# Patient Record
Sex: MALE | Race: WHITE | NOT HISPANIC OR LATINO
[De-identification: names, ages, dates, MRNs, and addresses within clinical notes are randomized per-mention and may not be internally consistent; named-entity substitution may affect disease eponyms.]

---

## 2018-08-17 ENCOUNTER — TRANSCRIPTION ENCOUNTER (OUTPATIENT)
Age: 18
End: 2018-08-17

## 2019-02-09 ENCOUNTER — TRANSCRIPTION ENCOUNTER (OUTPATIENT)
Age: 19
End: 2019-02-09

## 2019-03-12 ENCOUNTER — APPOINTMENT (OUTPATIENT)
Dept: PSYCHIATRY | Facility: CLINIC | Age: 19
End: 2019-03-12
Payer: COMMERCIAL

## 2019-03-12 PROCEDURE — 99205 OFFICE O/P NEW HI 60 MIN: CPT

## 2019-04-05 ENCOUNTER — APPOINTMENT (OUTPATIENT)
Dept: PSYCHIATRY | Facility: CLINIC | Age: 19
End: 2019-04-05
Payer: COMMERCIAL

## 2019-04-05 PROCEDURE — 99214 OFFICE O/P EST MOD 30 MIN: CPT

## 2019-05-10 ENCOUNTER — APPOINTMENT (OUTPATIENT)
Dept: PSYCHIATRY | Facility: CLINIC | Age: 19
End: 2019-05-10
Payer: COMMERCIAL

## 2019-05-10 PROCEDURE — 99214 OFFICE O/P EST MOD 30 MIN: CPT

## 2019-06-07 ENCOUNTER — APPOINTMENT (OUTPATIENT)
Dept: PSYCHIATRY | Facility: CLINIC | Age: 19
End: 2019-06-07
Payer: COMMERCIAL

## 2019-06-07 PROCEDURE — 99214 OFFICE O/P EST MOD 30 MIN: CPT

## 2019-07-08 ENCOUNTER — RX RENEWAL (OUTPATIENT)
Age: 19
End: 2019-07-08

## 2019-07-22 ENCOUNTER — APPOINTMENT (OUTPATIENT)
Dept: PSYCHIATRY | Facility: CLINIC | Age: 19
End: 2019-07-22
Payer: COMMERCIAL

## 2019-07-22 PROCEDURE — 99214 OFFICE O/P EST MOD 30 MIN: CPT

## 2019-09-09 ENCOUNTER — RX RENEWAL (OUTPATIENT)
Age: 19
End: 2019-09-09

## 2019-10-14 ENCOUNTER — APPOINTMENT (OUTPATIENT)
Dept: PSYCHIATRY | Facility: CLINIC | Age: 19
End: 2019-10-14
Payer: COMMERCIAL

## 2019-10-14 PROCEDURE — 99214 OFFICE O/P EST MOD 30 MIN: CPT

## 2019-12-19 ENCOUNTER — APPOINTMENT (OUTPATIENT)
Dept: PSYCHIATRY | Facility: CLINIC | Age: 19
End: 2019-12-19
Payer: COMMERCIAL

## 2019-12-19 PROCEDURE — 99214 OFFICE O/P EST MOD 30 MIN: CPT

## 2020-02-14 ENCOUNTER — APPOINTMENT (OUTPATIENT)
Dept: PSYCHIATRY | Facility: CLINIC | Age: 20
End: 2020-02-14
Payer: COMMERCIAL

## 2020-02-14 PROCEDURE — 99214 OFFICE O/P EST MOD 30 MIN: CPT

## 2020-04-17 RX ORDER — ESCITALOPRAM OXALATE 10 MG/1
10 TABLET ORAL
Qty: 30 | Refills: 0 | Status: DISCONTINUED | COMMUNITY
Start: 2019-03-12 | End: 2020-04-17

## 2020-04-17 RX ORDER — ESCITALOPRAM OXALATE 5 MG/1
5 TABLET ORAL
Qty: 30 | Refills: 1 | Status: DISCONTINUED | COMMUNITY
Start: 2020-02-14 | End: 2020-04-17

## 2020-05-12 ENCOUNTER — APPOINTMENT (OUTPATIENT)
Dept: PSYCHIATRY | Facility: CLINIC | Age: 20
End: 2020-05-12
Payer: COMMERCIAL

## 2020-05-12 PROCEDURE — 99214 OFFICE O/P EST MOD 30 MIN: CPT | Mod: 95

## 2020-08-31 ENCOUNTER — APPOINTMENT (OUTPATIENT)
Dept: PSYCHIATRY | Facility: CLINIC | Age: 20
End: 2020-08-31
Payer: COMMERCIAL

## 2020-08-31 PROCEDURE — 99214 OFFICE O/P EST MOD 30 MIN: CPT | Mod: 95

## 2020-11-11 ENCOUNTER — TRANSCRIPTION ENCOUNTER (OUTPATIENT)
Age: 20
End: 2020-11-11

## 2020-12-09 ENCOUNTER — APPOINTMENT (OUTPATIENT)
Dept: PSYCHIATRY | Facility: CLINIC | Age: 20
End: 2020-12-09
Payer: COMMERCIAL

## 2020-12-09 PROCEDURE — 99214 OFFICE O/P EST MOD 30 MIN: CPT

## 2020-12-09 PROCEDURE — 99072 ADDL SUPL MATRL&STAF TM PHE: CPT

## 2020-12-09 RX ORDER — TRAZODONE HYDROCHLORIDE 50 MG/1
50 TABLET ORAL
Qty: 30 | Refills: 1 | Status: DISCONTINUED | COMMUNITY
Start: 2019-06-07 | End: 2020-12-09

## 2021-02-24 ENCOUNTER — APPOINTMENT (OUTPATIENT)
Dept: PSYCHIATRY | Facility: CLINIC | Age: 21
End: 2021-02-24
Payer: COMMERCIAL

## 2021-02-24 PROCEDURE — 99072 ADDL SUPL MATRL&STAF TM PHE: CPT

## 2021-02-24 PROCEDURE — 99214 OFFICE O/P EST MOD 30 MIN: CPT

## 2021-05-24 ENCOUNTER — APPOINTMENT (OUTPATIENT)
Dept: PSYCHIATRY | Facility: CLINIC | Age: 21
End: 2021-05-24

## 2021-05-25 ENCOUNTER — APPOINTMENT (OUTPATIENT)
Dept: PSYCHIATRY | Facility: CLINIC | Age: 21
End: 2021-05-25
Payer: COMMERCIAL

## 2021-05-25 PROCEDURE — 99214 OFFICE O/P EST MOD 30 MIN: CPT

## 2021-05-25 PROCEDURE — 99072 ADDL SUPL MATRL&STAF TM PHE: CPT

## 2021-08-24 ENCOUNTER — APPOINTMENT (OUTPATIENT)
Dept: PSYCHIATRY | Facility: CLINIC | Age: 21
End: 2021-08-24
Payer: COMMERCIAL

## 2021-08-24 PROCEDURE — 99214 OFFICE O/P EST MOD 30 MIN: CPT

## 2021-11-24 ENCOUNTER — APPOINTMENT (OUTPATIENT)
Dept: PSYCHIATRY | Facility: CLINIC | Age: 21
End: 2021-11-24
Payer: COMMERCIAL

## 2021-11-24 PROCEDURE — 99214 OFFICE O/P EST MOD 30 MIN: CPT

## 2022-02-08 ENCOUNTER — APPOINTMENT (OUTPATIENT)
Dept: PSYCHIATRY | Facility: CLINIC | Age: 22
End: 2022-02-08
Payer: COMMERCIAL

## 2022-02-08 PROCEDURE — 99214 OFFICE O/P EST MOD 30 MIN: CPT | Mod: 95

## 2022-06-14 ENCOUNTER — APPOINTMENT (OUTPATIENT)
Dept: PSYCHIATRY | Facility: CLINIC | Age: 22
End: 2022-06-14
Payer: COMMERCIAL

## 2022-06-14 PROCEDURE — 99214 OFFICE O/P EST MOD 30 MIN: CPT | Mod: 95

## 2022-11-14 ENCOUNTER — APPOINTMENT (OUTPATIENT)
Dept: PSYCHIATRY | Facility: CLINIC | Age: 22
End: 2022-11-14

## 2022-11-14 PROCEDURE — 99214 OFFICE O/P EST MOD 30 MIN: CPT | Mod: 95

## 2022-11-14 RX ORDER — ALUMINUM CHLORIDE 20 %
20 SOLUTION, NON-ORAL TOPICAL
Qty: 60 | Refills: 0 | Status: DISCONTINUED | COMMUNITY
Start: 2022-06-27

## 2022-11-14 RX ORDER — MELOXICAM 15 MG/1
15 TABLET ORAL
Qty: 30 | Refills: 0 | Status: DISCONTINUED | COMMUNITY
Start: 2022-05-17

## 2022-11-14 NOTE — PLAN
[Medication education provided] : Medication education provided. [FreeTextEntry5] : Psychoeducation and supportive therapy\par Continue mindfulness meditation and breathing exercises regularly\par Medication: continue Lexapro 10+5 mg/day \par Educated patient of adverse effects of excess and frequent ETOH use and importance of remaining abstinent from drugs \par Emergency procedures were discussed: pt. educated to call 911 or go to nearest ER for worsening of symptoms/suicidal/homicidal ideation. \par RTC in 3 months or earlier as needed. \par Patient expressed understanding and agreement with above plan. \par

## 2022-11-14 NOTE — DISCUSSION/SUMMARY
[FreeTextEntry1] : The patient is a 17 yo man with familial predisposition and anxious predisposition reports sx consistent with generalized anxiety disorder and depressive disorder NOS and his sx are increasing since 2018 summer, causing significant distress and  affecting his daily functioning. \par \par Patient is continues to remain stable and he reported able to manage stress and anxiety better and denies any sx of depression.\par

## 2022-11-14 NOTE — HISTORY OF PRESENT ILLNESS
[Medical Office: ([facility])___] : at the medical office located in  [Verbal consent obtained from patient] : the patient, [unfilled] [Other Location: e.g. School (Enter Location, City,State)___] : at [unfilled], at the time of the visit. [FreeTextEntry1] : This AV Visit was conducted using [employer] platform\par \par Patient reported that he is "doing very well".  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  Patient reported that has helped manage his anxiety about school a lot better.  Patient states that he is taking Lexapro every day in the morning and that has been really good for his sleep because he is waking up in the morning to take it and by night he is tired and able to fall asleep and stay asleep well.  Patient reported no increase in anxiety symptoms, denied any depression symptoms since last visit.\par Appetite-good\par He reports no passive/active SI\par ETOH: reports is less, because he is busy with studying \par No drug use reported\par \par No new medical issues

## 2023-04-10 ENCOUNTER — APPOINTMENT (OUTPATIENT)
Dept: PSYCHIATRY | Facility: CLINIC | Age: 23
End: 2023-04-10
Payer: COMMERCIAL

## 2023-04-10 ENCOUNTER — NON-APPOINTMENT (OUTPATIENT)
Age: 23
End: 2023-04-10

## 2023-04-10 PROCEDURE — 99214 OFFICE O/P EST MOD 30 MIN: CPT | Mod: 95

## 2023-04-10 NOTE — HISTORY OF PRESENT ILLNESS
[Medical Office: ([facility])___] : at the medical office located in  [Verbal consent obtained from patient] : the patient, [unfilled] [Other Location: e.g. School (Enter Location, City,State)___] : at [unfilled], at the time of the visit. [FreeTextEntry1] : This AV Visit was conducted using [employer] platform\par \par Patient reported that he is continuing to do very well.  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  Patient states that he will reach out with the name of the medication that the allergist may recommend if it has any interactions with Lexapro.

## 2023-04-10 NOTE — DISCUSSION/SUMMARY
[FreeTextEntry1] : The patient is a 19 yo man with familial predisposition and anxious predisposition reports sx consistent with generalized anxiety disorder and depressive disorder NOS and his sx are increasing since 2018 summer, causing significant distress and  affecting his daily functioning. \par \par Patient continues to remain stable with good control of anxiety and depression symptoms since last visit\par

## 2023-06-09 ENCOUNTER — NON-APPOINTMENT (OUTPATIENT)
Age: 23
End: 2023-06-09

## 2023-12-13 ENCOUNTER — APPOINTMENT (OUTPATIENT)
Dept: PSYCHIATRY | Facility: CLINIC | Age: 23
End: 2023-12-13
Payer: COMMERCIAL

## 2023-12-13 PROCEDURE — 99214 OFFICE O/P EST MOD 30 MIN: CPT

## 2024-02-18 NOTE — HISTORY OF PRESENT ILLNESS
[FreeTextEntry1] : The patient last seen for f/u visit on 4/10/2023. He comes back today for a med manageemnt folow up visit.  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 States that he continued to take Lexapro daily, as was precscribed, 15 mg/day, until he ran out of meds about 3 weeks. He reported he staretd to notice that he is "dwelling on things more" and getting frustrted easily. He was worried about reemergence of KENRICK sympotms completely and wants to be back on meds soon.  He reported no sympotms of depression since last visit.  He reported good sleep routine and adequate sleep.  States he is exercising daily.  Appetite-good He reports no passive/active SI ETOH: reports is less, because he is busy with work.  No drug use reported No new medical issues, no new medication since last visit

## 2024-02-18 NOTE — DISCUSSION/SUMMARY
[FreeTextEntry1] : The patient is a 17 yo man with familial predisposition and anxious predisposition reports sx consistent with generalized anxiety disorder and depressive disorder NOS and his sx are increasing since 2018 summer, causing significant distress and  affecting his daily functioning.   Patient states he was doing well until he continued to take meds, and 3 weeks ago since he is out of meds, started to experience reemrgence of mild sympotms of KENRICK, no deperssion sympotms since last visit, will benefit from resuming meds and remaining on meds for continued stability.

## 2024-02-18 NOTE — PLAN
[FreeTextEntry5] : Psychoeducation and supportive therapy, educaiton about risk vs benefits of meds, no meds and risk of relapse without meds, and importance of adherence with meds and regular follow up visits to monitor for continued stability of sympotms and need for med changes.  Continue mindfulness meditation and breathing exercises regularly Medication: Resume Lexapro, start with 5 mg/day for 5 days then 10 mg/day for 5 days then 10+5 mg/day  Educated patient of adverse effects of excess and frequent ETOH use and importance of remaining abstinent from drugs  Emergency procedures were discussed: pt. educated to call 911 or go to nearest ER for worsening of symptoms/suicidal/homicidal ideation.  RTC in 3 months or earlier as needed.  Patient expressed understanding and agreement with above plan.   I stop checked, no controlled substance Rx in past 12 months: Reference #: [id number]

## 2024-03-25 ENCOUNTER — APPOINTMENT (OUTPATIENT)
Dept: PSYCHIATRY | Facility: CLINIC | Age: 24
End: 2024-03-25
Payer: COMMERCIAL

## 2024-03-25 DIAGNOSIS — F41.1 GENERALIZED ANXIETY DISORDER: ICD-10-CM

## 2024-03-25 DIAGNOSIS — F32.A DEPRESSION, UNSPECIFIED: ICD-10-CM

## 2024-03-25 PROCEDURE — 99214 OFFICE O/P EST MOD 30 MIN: CPT | Mod: 95

## 2024-03-25 PROCEDURE — G2211 COMPLEX E/M VISIT ADD ON: CPT | Mod: 95

## 2024-03-25 RX ORDER — ESCITALOPRAM OXALATE 10 MG/1
10 TABLET ORAL
Qty: 30 | Refills: 2 | Status: ACTIVE | COMMUNITY
Start: 2020-02-14 | End: 1900-01-01

## 2024-03-25 RX ORDER — ESCITALOPRAM OXALATE 5 MG/1
5 TABLET ORAL
Qty: 30 | Refills: 2 | Status: ACTIVE | COMMUNITY
Start: 2019-06-07 | End: 1900-01-01

## 2024-03-25 NOTE — HISTORY OF PRESENT ILLNESS
[FreeTextEntry1] : Patient reported that after last visit he slowly titrated the dose for Lexapro back to 15 mg daily and continue taking that every day.  Patient reported much improved general anxiety symptoms denied excessive worrying feeling tensed on edge and also reported not feeling depressed since last visit.  Patient states he feels like he is "in a nice, good spot".   He reported good sleep routine and adequate sleep.  States he is exercising daily.  Appetite-good He reports no passive/active SI ETOH: reports is less, because he is busy with work.  No drug use reported. No new medical issues, no new medication since last visit

## 2024-03-25 NOTE — DISCUSSION/SUMMARY
[FreeTextEntry1] : The patient is a 17 yo man with familial predisposition and anxious predisposition reports sx consistent with generalized anxiety disorder and depressive disorder NOS and his sx are increasing since 2018 summer, causing significant distress and  affecting his daily functioning.   Patient was off of medication for a few weeks before the last visit in December 2023 and he was starting to experience reemergence of symptoms of generalized anxiety disorder.  Patient has restarted Lexapro titrated back to his previous dose of 50 mg daily and reports that in the past 3 months he has been doing well with the better control of his generalized anxiety symptoms and depression symptoms.

## 2024-03-25 NOTE — PHYSICAL EXAM
[None] : none [Average] : average [Full] : full [Cooperative] : cooperative [Clear] : clear [None Reported] : none reported [Linear/Goal Directed] : linear/goal directed [FreeTextEntry8] : "a little more anxious"  [WNL] : within normal limits [Euthymic] : euthymic [FreeTextEntry7] : No SI/HI

## 2024-03-25 NOTE — PLAN
[Medication education provided] : Medication education provided. [Rationale for medication choices, possible risks/precautions, benefits, alternative treatment choices, and consequences of non-treatment discussed] : Rationale for medication choices, possible risks/precautions, benefits, alternative treatment choices, and consequences of non-treatment discussed with patient/family/caregiver  [FreeTextEntry5] : Psychoeducation and supportive therapy and rationale for continuing current medication.  Continue mindfulness meditation and breathing exercises regularly. Medication: Continue Lexapro 10+5 mg/day  Educated patient of adverse effects of excess and frequent ETOH use and importance of remaining abstinent from drugs  Emergency procedures were discussed: pt. educated to call 911 or go to nearest ER for worsening of symptoms/suicidal/homicidal ideation.  RTC in 3 months or earlier as needed.  Patient expressed understanding and agreement with above plan.

## 2024-03-25 NOTE — REASON FOR VISIT
[Patient] : Patient [Patient preference] : as per patient preference [Medical Office: ([facility] [place])___] : The provider was located at the medical office in [unfilled]. [Home] : The patient, [unfilled], was located at home, [unfilled], at the time of the visit. [Verbal consent obtained from patient/other participant(s)] : Verbal consent for telehealth/telephonic services obtained from patient/other participant(s) [FreeTextEntry1] : anxiety

## 2024-07-29 ENCOUNTER — APPOINTMENT (OUTPATIENT)
Dept: PSYCHIATRY | Facility: CLINIC | Age: 24
End: 2024-07-29
Payer: COMMERCIAL

## 2024-07-29 DIAGNOSIS — F41.1 GENERALIZED ANXIETY DISORDER: ICD-10-CM

## 2024-07-29 DIAGNOSIS — F32.A DEPRESSION, UNSPECIFIED: ICD-10-CM

## 2024-07-29 PROCEDURE — 99214 OFFICE O/P EST MOD 30 MIN: CPT | Mod: 95

## 2024-07-29 PROCEDURE — G2211 COMPLEX E/M VISIT ADD ON: CPT | Mod: 95

## 2024-07-29 NOTE — REASON FOR VISIT
[Patient preference] : as per patient preference [Medical Office: ([facility])___] : The provider was located at the medical office in [unfilled]. [Verbal consent obtained from patient/other participant(s)] : Verbal consent for telehealth/telephonic services obtained from patient/other participant(s) [Patient] : Patient [Other Location: e.g. Home (Enter Location, City,State)___] : The patient, [unfilled], was located at [unfilled] at the time of the visit. [FreeTextEntry1] : anxiety

## 2024-07-29 NOTE — DISCUSSION/SUMMARY
[FreeTextEntry1] : The patient is a 17 yo man with familial predisposition and anxious predisposition reports sx consistent with generalized anxiety disorder and depressive disorder NOS and his sx are increasing since 2018 summer, causing significant distress and  affecting his daily functioning.   3/25/2024:  Patient was off of medication for a few weeks before the last visit in December 2023 and he was starting to experience reemergence of symptoms of generalized anxiety disorder.  Patient has restarted Lexapro titrated back to his previous dose of 50 mg daily and reports that in the past 3 months he has been doing well with the better control of his generalized anxiety symptoms and depression symptoms.    7/29/2024: Patient reported sustained improvement in symptoms of general anxiety disorder and no depression symptoms since last visit.

## 2024-07-29 NOTE — HISTORY OF PRESENT ILLNESS
[FreeTextEntry1] : Patient reported the is continuing to do well since last visit.  He will be completing 1 year of his employment soon. He denied excessive worrying, denied feeling tensed and that he is not feeling depressed since last visit.   He reported good sleep routine and adequate sleep.  States he is exercising daily.  Appetite-good He reports no passive/active SI ETOH: reports is less, because he is busy with work.  No drug use reported. No new medical issues, no new medication since last visit

## 2024-11-27 ENCOUNTER — APPOINTMENT (OUTPATIENT)
Dept: PSYCHIATRY | Facility: CLINIC | Age: 24
End: 2024-11-27
Payer: COMMERCIAL

## 2024-11-27 DIAGNOSIS — F41.1 GENERALIZED ANXIETY DISORDER: ICD-10-CM

## 2024-11-27 DIAGNOSIS — F32.A DEPRESSION, UNSPECIFIED: ICD-10-CM

## 2024-11-27 PROCEDURE — 99214 OFFICE O/P EST MOD 30 MIN: CPT | Mod: 95

## 2024-11-27 PROCEDURE — G2211 COMPLEX E/M VISIT ADD ON: CPT | Mod: NC,95

## 2025-03-05 ENCOUNTER — APPOINTMENT (OUTPATIENT)
Dept: PSYCHIATRY | Facility: CLINIC | Age: 25
End: 2025-03-05
Payer: COMMERCIAL

## 2025-03-05 DIAGNOSIS — G47.00 INSOMNIA, UNSPECIFIED: ICD-10-CM

## 2025-03-05 DIAGNOSIS — F41.1 GENERALIZED ANXIETY DISORDER: ICD-10-CM

## 2025-03-05 PROCEDURE — 99214 OFFICE O/P EST MOD 30 MIN: CPT | Mod: 95

## 2025-03-05 RX ORDER — TRAZODONE HYDROCHLORIDE 50 MG/1
50 TABLET ORAL
Qty: 30 | Refills: 1 | Status: ACTIVE | COMMUNITY
Start: 2025-03-05 | End: 1900-01-01

## 2025-06-11 ENCOUNTER — APPOINTMENT (OUTPATIENT)
Dept: PSYCHIATRY | Facility: CLINIC | Age: 25
End: 2025-06-11
Payer: COMMERCIAL

## 2025-06-11 PROCEDURE — G2211 COMPLEX E/M VISIT ADD ON: CPT | Mod: NC,95

## 2025-06-11 PROCEDURE — 99214 OFFICE O/P EST MOD 30 MIN: CPT | Mod: 95

## 2025-07-05 ENCOUNTER — NON-APPOINTMENT (OUTPATIENT)
Age: 25
End: 2025-07-05

## 2025-07-07 ENCOUNTER — NON-APPOINTMENT (OUTPATIENT)
Age: 25
End: 2025-07-07